# Patient Record
Sex: FEMALE | Race: BLACK OR AFRICAN AMERICAN | NOT HISPANIC OR LATINO | Employment: FULL TIME | URBAN - METROPOLITAN AREA
[De-identification: names, ages, dates, MRNs, and addresses within clinical notes are randomized per-mention and may not be internally consistent; named-entity substitution may affect disease eponyms.]

---

## 2017-01-24 ENCOUNTER — OFFICE VISIT (OUTPATIENT)
Dept: OBSTETRICS AND GYNECOLOGY | Age: 37
End: 2017-01-24

## 2017-01-24 VITALS
HEIGHT: 71 IN | SYSTOLIC BLOOD PRESSURE: 124 MMHG | WEIGHT: 251 LBS | BODY MASS INDEX: 35.14 KG/M2 | DIASTOLIC BLOOD PRESSURE: 86 MMHG

## 2017-01-24 DIAGNOSIS — E03.8 OTHER SPECIFIED HYPOTHYROIDISM: Primary | ICD-10-CM

## 2017-01-24 DIAGNOSIS — N76.0 ACUTE VAGINITIS: ICD-10-CM

## 2017-01-24 PROCEDURE — 99213 OFFICE O/P EST LOW 20 MIN: CPT | Performed by: PHYSICIAN ASSISTANT

## 2017-01-24 RX ORDER — METRONIDAZOLE 500 MG/1
500 TABLET ORAL 2 TIMES DAILY
Qty: 14 TABLET | Refills: 0 | Status: SHIPPED | OUTPATIENT
Start: 2017-01-24 | End: 2017-01-31

## 2017-01-24 RX ORDER — LEVOTHYROXINE SODIUM 0.07 MG/1
75 TABLET ORAL DAILY
Qty: 30 TABLET | Refills: 1 | Status: SHIPPED | OUTPATIENT
Start: 2017-01-24 | End: 2018-03-29

## 2017-01-24 NOTE — MR AVS SNAPSHOT
Jerrica Rosses   1/24/2017 11:00 AM   Office Visit    Dept Phone:  [phone]   Encounter #:  [id number]    Provider:  ROBEL Rdz   Department:  [facility]                Your Full Care Plan              Today's Medication Changes          These changes are accurate as of: 1/24/17 11:40 AM.  If you have any questions, ask your nurse or doctor.               New Medication(s)Ordered:     metroNIDAZOLE 500 MG tablet   Commonly known as:  FLAGYL   Take 1 tablet by mouth 2 (Two) Times a Day for 7 days.   Started by:  ROBEL Rdz            Where to Get Your Medications      These medications were sent to [facility] [address] [place] [address] [address] [facility] AT [facility] - [phone]  - [phone]   [address] [place] [id number]    Hours:  24-hours Phone:  [phone]     levothyroxine 75 MCG tablet    metroNIDAZOLE 500 MG tablet                  Your Updated Medication List          This list is accurate as of: 1/24/17 11:40 AM.  Always use your most recent med list.                amLODIPine 10 MG tablet   Commonly known as:  NORVASC       levothyroxine 75 MCG tablet   Commonly known as:  SYNTHROID, LEVOTHROID   Take 1 tablet by mouth Daily.       metroNIDAZOLE 500 MG tablet   Commonly known as:  FLAGYL   Take 1 tablet by mouth 2 (Two) Times a Day for 7 days.               We Performed the Following     NuSwab Vaginitis (VG)     TSH       You Were Diagnosed With        Codes Comments    Other specified hypothyroidism    -  Primary ICD-10-CM: E03.8  ICD-9-CM: 244.8     Acute vaginitis     ICD-10-CM: N76.0  ICD-9-CM: 616.10       Instructions     None    Patient Instructions History      Upcoming Appointments     Visit Type Date Time Department    GYN FOLLOW UP 1/24/2017 11:00 AM YANET GARCIA    WELLNESS 2/22/2017 12:00 PM YANET AGUERO [place]      MyChart Signup     [facility]  "allows you to send messages to your doctor, view your test results, renew your prescriptions, schedule appointments, and more. To sign up, go to [employer] and click on the Sign Up Now link in the New User? box. Enter your Offline Media Activation Code exactly as it appears below along with the last four digits of your Social Security Number and your Date of Birth () to complete the sign-up process. If you do not sign up before the expiration date, you must request a new code.    Offline Media Activation Code: [email or web address]  Expires: 2017 11:40 AM    If you have questions, you can email [email or web address] or call [phone] to talk to our [employer] staff. Remember, [employer] is NOT to be used for urgent needs. For medical emergencies, dial 911.               Other Info from Your Visit           Your Appointments     2017 12:00 PM EST   Wellness with Vicki Gooden MD   [facility] (--)    [address] [place] [id number] [phone]              Allergies     No Known Allergies      Reason for Visit     Follow-up c/o vaginal odor, and never followed up after iud insertion.      Vital Signs     Blood Pressure Height Weight Last Menstrual Period Breastfeeding? Body Mass Index    124/86 71\" (180.3 cm) 251 lb (114 kg) 2016 (Approximate) No 35.01 kg/m2    Smoking Status                   Never Smoker           Problems and Diagnoses Noted     Underactive thyroid    -  Primary    Inflammation of vagina            "

## 2017-01-24 NOTE — PROGRESS NOTES
"Subjective     Chief Complaint   Patient presents with   • Follow-up     c/o vaginal odor, and never followed up after iud insertion.       Jerrica Vargas is a 36 y.o.  whose LMP is Patient's last menstrual period was 2016 (approximate). presents with vaginal odor.  Seems to happen after her cycle. She is rarely having cycles since she placed the IUD in march of last year.  She denies VD risk.  She is pretty happy with her IUD otherwise.  It does look like she is overdue for her annual, she was not aware of that likely b/c she was seen frequently for irreg bleeding and iud placement.  She will schedule that    She is not currently taking her BP meds or thyroid meds as she has not been back to her PCP, she just doesn't really care for him and tried to find another doc but couldn't find one accepting new pt's.      No Additional Complaints Reported    The following portions of the patient's history were reviewed and updated as appropriate:vital signs, allergies, current medications, past medical history, past social history, past surgical history and problem list      Review of Systems   Pertinent items are noted in HPI.     Objective        Visit Vitals   • /86   • Ht 71\" (180.3 cm)   • Wt 251 lb (114 kg)   • LMP 2016 (Approximate)   • Breastfeeding No   • BMI 35.01 kg/m2       Physical Exam    General:   alert, comfortable and no distress   Heart: Not performed today   Lungs: Not performed today.   Breast: Not performed today   Neck: na   Abdomen: {Not performed today   CVA: Not performed today   Pelvis: Vaginal: discharge, white  Ph increased, + odor. IUD strings seen   Extremities: Not performed today   Neurologic: negative   Psychiatric: Normal affect, judgement, and mood       Lab Review   Labs: No data reviewed     Imaging   No data reviewed    Assessment/Plan     ASSESSMENT  1. Other specified hypothyroidism    2. Acute vaginitis        PLAN  1.   Orders Placed This Encounter "   Procedures   • NuSwab Vaginitis (VG)   • TSH       2. Medications prescribed this encounter:        New Medications Ordered This Visit   Medications   • levothyroxine (SYNTHROID, LEVOTHROID) 75 MCG tablet     Sig: Take 1 tablet by mouth Daily.     Dispense:  30 tablet     Refill:  1   • metroNIDAZOLE (FLAGYL) 500 MG tablet     Sig: Take 1 tablet by mouth 2 (Two) Times a Day for 7 days.     Dispense:  14 tablet     Refill:  0       3. Start Flagyl for suspected BV  4. Sent in refill of thyroid meds but advised to wait for  until we get her thyroid results back  5. Advised to find new pcp and in meantime see old PCP for refill of BP meds (BP is stable today)    Follow up: 4 week(s) approx for AE    ROBEL Rucker  1/24/2017

## 2017-01-25 LAB — TSH SERPL DL<=0.005 MIU/L-ACNC: 1.9 MIU/ML (ref 0.27–4.2)

## 2017-01-27 ENCOUNTER — TELEPHONE (OUTPATIENT)
Dept: OBSTETRICS AND GYNECOLOGY | Age: 37
End: 2017-01-27

## 2017-01-27 LAB
A VAGINAE DNA VAG QL NAA+PROBE: ABNORMAL SCORE
BVAB2 DNA VAG QL NAA+PROBE: ABNORMAL SCORE
C ALBICANS DNA VAG QL NAA+PROBE: NEGATIVE
C GLABRATA DNA VAG QL NAA+PROBE: NEGATIVE
MEGA1 DNA VAG QL NAA+PROBE: ABNORMAL SCORE
T VAGINALIS RRNA SPEC QL NAA+PROBE: NEGATIVE

## 2017-01-27 NOTE — TELEPHONE ENCOUNTER
----- Message from ROBEL Rdz sent at 1/27/2017  9:12 AM EST -----  Let her know she is showing + for BV, already treated at visit

## 2017-02-20 ENCOUNTER — OFFICE VISIT (OUTPATIENT)
Dept: OBSTETRICS AND GYNECOLOGY | Age: 37
End: 2017-02-20

## 2017-02-20 VITALS
BODY MASS INDEX: 34.44 KG/M2 | SYSTOLIC BLOOD PRESSURE: 128 MMHG | DIASTOLIC BLOOD PRESSURE: 80 MMHG | WEIGHT: 246 LBS | HEIGHT: 71 IN

## 2017-02-20 DIAGNOSIS — Z11.51 SCREENING FOR HPV (HUMAN PAPILLOMAVIRUS): ICD-10-CM

## 2017-02-20 DIAGNOSIS — Z01.419 ENCOUNTER FOR GYNECOLOGICAL EXAMINATION: Primary | ICD-10-CM

## 2017-02-20 PROCEDURE — 99395 PREV VISIT EST AGE 18-39: CPT | Performed by: OBSTETRICS & GYNECOLOGY

## 2017-02-20 NOTE — PROGRESS NOTES
"Subjective     Chief Complaint   Patient presents with   • Annual Exam     no problems       History of Present Illness    Jerrica Vargas is a 36 y.o.  who presents for annual exam.  Her menses are irregular, lasting 0-3 days, dysmenorrhea none; pt likes Mirena, has occasional menses with very light flow   Obstetric History:  OB History      Para Term  AB TAB SAB Ectopic Multiple Living    2 1 1  1  1   1         Menstrual History:     Patient's last menstrual period was 2017.         Current contraception: IUD  History of abnormal Pap smear: no  Received Gardasil immunization: no  Perform regular self breast exam: no  Family history of uterine or ovarian cancer: no  Family History of colon cancer: no  Family history of breast cancer: no    Mammogram: recommended baseline, pt will consider  Colonoscopy: not indicated.  DEXA: not indicated.    Exercise: moderately active      The following portions of the patient's history were reviewed and updated as appropriate: allergies, current medications, past family history, past medical history, past social history, past surgical history and problem list.    Review of Systems    A comprehensive review of systems was negative.     Objective   Physical Exam    Visit Vitals   • /80   • Ht 71\" (180.3 cm)   • Wt 246 lb (112 kg)   • LMP 2017   • BMI 34.31 kg/m2       General:   alert, appears stated age, cooperative and no distress   Neck: no asymmetry, masses, or scars and thyroid normal to palpation   Heart: regular rate and rhythm, S1, S2 normal, no murmur, click, rub or gallop   Lungs: clear to auscultation bilaterally   Abdomen: soft, non-tender, without masses or organomegaly   Breast: inspection negative, no nipple discharge or bleeding, no masses or nodularity palpable   Vulva: normal, Bartholin's, Urethra, [place]'s normal   Vagina: normal mucosa, normal discharge   Cervix: no lesions and and IUD string noted   Uterus: normal size, " mobile, non-tender   Adnexa: normal adnexa and no mass, fullness, tenderness   Rectal: not indicated     Assessment/Plan   Jerrica was seen today for annual exam.    Diagnoses and all orders for this visit:    Encounter for gynecological examination  -     IGP, Apt HPV,rfx 16 / 18,45    Screening for HPV (human papillomavirus)  -     IGP, Apt HPV,rfx 16 / 18,45        All questions answered.  Breast self exam technique reviewed and patient encouraged to perform self-exam monthly.  Discussed healthy lifestyle modifications.  Recommended 30 minutes of aerobic exercise five times per week.    Recommended baseline mammo, pt will consider    Happy with mirena, placed 4/16

## 2017-02-23 LAB
CYTOLOGIST CVX/VAG CYTO: NORMAL
CYTOLOGY CVX/VAG DOC THIN PREP: NORMAL
DX ICD CODE: NORMAL
HIV 1 & 2 AB SER-IMP: NORMAL
HPV I/H RISK 4 DNA CVX QL PROBE+SIG AMP: NEGATIVE
OTHER STN SPEC: NORMAL
PATH REPORT.FINAL DX SPEC: NORMAL
STAT OF ADQ CVX/VAG CYTO-IMP: NORMAL

## 2017-02-24 ENCOUNTER — TELEPHONE (OUTPATIENT)
Dept: OBSTETRICS AND GYNECOLOGY | Age: 37
End: 2017-02-24

## 2017-02-24 NOTE — TELEPHONE ENCOUNTER
----- Message from Vicki Gooden MD sent at 2/24/2017  2:01 AM EST -----  Reviewed negative pap and hpv  Routed to MA to call pt

## 2017-09-01 ENCOUNTER — TELEPHONE (OUTPATIENT)
Dept: OBSTETRICS AND GYNECOLOGY | Age: 37
End: 2017-09-01

## 2017-09-01 NOTE — TELEPHONE ENCOUNTER
Dr Gooden pt, pt has been having a light bron/dark red dx since her last period. Pt's last period was about 3 weeks ago. Pt states no smell. Pt states this normally happens after her periods but it typically only lasts for a couple of days. She spoke about this issue with Dr Gooden at her AE. Please advise.     Pt#: [phone]

## 2017-09-01 NOTE — TELEPHONE ENCOUNTER
She has the IUD, it's not uncommon to have irregular bleeding/spotting/prolonged period.  If she wants to be further evaluated we can schedule an ultrasound for further w/u. She can also perform a UPT at home to rule out that possibility

## 2017-09-07 ENCOUNTER — OFFICE VISIT (OUTPATIENT)
Dept: OBSTETRICS AND GYNECOLOGY | Age: 37
End: 2017-09-07

## 2017-09-07 ENCOUNTER — PROCEDURE VISIT (OUTPATIENT)
Dept: OBSTETRICS AND GYNECOLOGY | Age: 37
End: 2017-09-07

## 2017-09-07 VITALS
BODY MASS INDEX: 31.78 KG/M2 | WEIGHT: 227 LBS | HEIGHT: 71 IN | SYSTOLIC BLOOD PRESSURE: 130 MMHG | DIASTOLIC BLOOD PRESSURE: 80 MMHG

## 2017-09-07 DIAGNOSIS — N76.0 ACUTE VAGINITIS: ICD-10-CM

## 2017-09-07 DIAGNOSIS — T83.9XXA COMPLICATION OF INTRAUTERINE DEVICE (IUD), UNSPECIFIED COMPLICATION, INITIAL ENCOUNTER (HCC): Primary | ICD-10-CM

## 2017-09-07 DIAGNOSIS — Z30.431 IUD CHECK UP: Primary | ICD-10-CM

## 2017-09-07 LAB
B-HCG UR QL: NEGATIVE
INTERNAL NEGATIVE CONTROL: NEGATIVE
INTERNAL POSITIVE CONTROL: POSITIVE
Lab: NORMAL

## 2017-09-07 PROCEDURE — 76830 TRANSVAGINAL US NON-OB: CPT | Performed by: NURSE PRACTITIONER

## 2017-09-07 PROCEDURE — 99213 OFFICE O/P EST LOW 20 MIN: CPT | Performed by: NURSE PRACTITIONER

## 2017-09-07 PROCEDURE — 81025 URINE PREGNANCY TEST: CPT | Performed by: NURSE PRACTITIONER

## 2017-09-07 RX ORDER — SUMATRIPTAN 100 MG/1
100 TABLET, FILM COATED ORAL
COMMUNITY
Start: 2017-03-21 | End: 2018-03-21

## 2017-09-07 RX ORDER — METRONIDAZOLE 500 MG/1
500 TABLET ORAL 2 TIMES DAILY
Qty: 14 TABLET | Refills: 0 | Status: SHIPPED | OUTPATIENT
Start: 2017-09-07 | End: 2017-09-14

## 2017-09-07 NOTE — PROGRESS NOTES
"Subjective   Jerrica Vargas is a 37 y.o. female is being seen today for   Chief Complaint   Patient presents with   • Menorrhagia     Pt has been bleeding with her Mirena. Happens off and on. Denies any pain.    .    History of Present Illness     Patient here with complaints of irregular spotting and brown discharge with IUD.  She wasn't sure if it was a problem with the IUD or something else.  She has noticed it off and on for the past 6 months.  She is not having periods every month but when she does it seems to stop like normal but then she notices a brown discharge for days/weeks following it.  She also notices a fishy odor with the discharge.  She is sexually active, no new partners.  No itching.  She does douche occasionally and did think it got worse after.  She is having a cycle once every 3 months and they are not nearly as heavy as they used to be.    She is a patient of Dr Scott.  She is a new patient to me.    The following portions of the patient's history were reviewed and updated as appropriate: allergies, current medications, past family history, past medical history, past social history, past surgical history and problem list.    /80  Ht 71\" (180.3 cm)  Wt 227 lb (103 kg)  LMP Comment: Mirena  BMI 31.66 kg/m2      Review of Systems   Constitutional: Negative.    HENT: Negative.    Eyes: Negative.    Respiratory: Negative.    Cardiovascular: Negative.    Gastrointestinal: Negative.    Endocrine: Negative.    Genitourinary: Positive for menstrual problem and vaginal discharge. Negative for dyspareunia, dysuria, pelvic pain and vaginal pain.   Musculoskeletal: Negative.    Skin: Negative.    Allergic/Immunologic: Negative.    Neurological: Negative.    Hematological: Negative.    Psychiatric/Behavioral: Negative.        Objective   Physical Exam   Constitutional: She is oriented to person, place, and time. She appears well-developed and well-nourished.   Genitourinary: Uterus normal. " Uterus is not tender. Cervix exhibits no motion tenderness, no discharge and no friability.   Genitourinary Comments: Moderate watery discharge noted, vagina otherwise normal.  No lesions. +strings noted and appropriate length.   Neurological: She is alert and oriented to person, place, and time.   Psychiatric: She has a normal mood and affect. Her behavior is normal.         Assessment/Plan   Jerrica was seen today for menorrhagia.    Diagnoses and all orders for this visit:    Complication of intrauterine device (IUD), unspecified complication, initial encounter  -     POC Pregnancy, Urine  -     NuSwab VG+    Acute vaginitis  -     NuSwab VG+    Other orders  -     metroNIDAZOLE (FLAGYL) 500 MG tablet; Take 1 tablet by mouth 2 (Two) Times a Day for 7 days.      Ultrasound done to check IUD placement and it is normal and in place.  There are a few small fibroids noted but these have been seen prior and are unchanged.  Based on her exam and symptoms I suspect BV.  Patient would like to move forward with treatment.  Will call with culture results.

## 2017-09-07 NOTE — PROGRESS NOTES
I have reviewed the notes, assessments, and/or procedures performed by JOSSE Butler, I concur with her/his documentation of Jerrica Vargas.

## 2017-09-12 LAB
A VAGINAE DNA VAG QL NAA+PROBE: ABNORMAL SCORE
BVAB2 DNA VAG QL NAA+PROBE: ABNORMAL SCORE
C ALBICANS DNA VAG QL NAA+PROBE: NEGATIVE
C GLABRATA DNA VAG QL NAA+PROBE: NEGATIVE
C TRACH RRNA SPEC QL NAA+PROBE: NEGATIVE
MEGA1 DNA VAG QL NAA+PROBE: ABNORMAL SCORE
N GONORRHOEA RRNA SPEC QL NAA+PROBE: NEGATIVE
T VAGINALIS RRNA SPEC QL NAA+PROBE: NEGATIVE

## 2017-09-15 ENCOUNTER — TELEPHONE (OUTPATIENT)
Dept: OBSTETRICS AND GYNECOLOGY | Age: 37
End: 2017-09-15

## 2017-09-15 NOTE — TELEPHONE ENCOUNTER
----- Message from STAN Mathur sent at 9/12/2017  4:53 PM EDT -----  Notify + BV, she was already treated

## 2018-03-29 ENCOUNTER — OFFICE VISIT (OUTPATIENT)
Dept: OBSTETRICS AND GYNECOLOGY | Age: 38
End: 2018-03-29

## 2018-03-29 VITALS
SYSTOLIC BLOOD PRESSURE: 120 MMHG | DIASTOLIC BLOOD PRESSURE: 70 MMHG | HEIGHT: 71 IN | WEIGHT: 234 LBS | BODY MASS INDEX: 32.76 KG/M2

## 2018-03-29 DIAGNOSIS — Z11.51 ENCOUNTER FOR SCREENING FOR HUMAN PAPILLOMAVIRUS (HPV): ICD-10-CM

## 2018-03-29 DIAGNOSIS — Z11.3 SCREENING EXAMINATION FOR STD (SEXUALLY TRANSMITTED DISEASE): ICD-10-CM

## 2018-03-29 DIAGNOSIS — Z01.419 ENCOUNTER FOR GYNECOLOGICAL EXAMINATION: Primary | ICD-10-CM

## 2018-03-29 DIAGNOSIS — T83.32XA INTRAUTERINE CONTRACEPTIVE DEVICE THREADS LOST, INITIAL ENCOUNTER: ICD-10-CM

## 2018-03-29 PROCEDURE — 99395 PREV VISIT EST AGE 18-39: CPT | Performed by: OBSTETRICS & GYNECOLOGY

## 2018-03-29 RX ORDER — SUMATRIPTAN 100 MG/1
100 TABLET, FILM COATED ORAL
COMMUNITY
Start: 2017-11-28 | End: 2018-11-28

## 2018-03-29 NOTE — PROGRESS NOTES
"Subjective     Chief Complaint   Patient presents with   • Gynecologic Exam       History of Present Illness    Jerrica Vargas is a 37 y.o.  who presents for annual exam.  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         Current contraception: IUD  History of abnormal Pap smear: no  Received Gardasil immunization: yes - had course  Perform regular self breast exam: yes - monthly  Family history of uterine or ovarian cancer: no  Family History of colon cancer: no  Family history of breast cancer: no    Mammogram: not indicated.; pt declines baseline  Colonoscopy: not indicated.  DEXA: not indicated.    Exercise: moderately active  Calcium/Vitamin D: adequate intake    The following portions of the patient's history were reviewed and updated as appropriate: allergies, current medications, past family history, past medical history, past social history, past surgical history and problem list.    Review of Systems    Review of Systems   Constitutional: Negative for fatigue.   Respiratory: Negative for shortness of breath.    Gastrointestinal: Negative for abdominal pain.   Genitourinary: Negative for dysuria.negative for heavy or irregular bleeding   Neurological: Positive for headaches.   Psychiatric/Behavioral: Negative for dysphoric mood.         Objective   Physical Exam    /70   Ht 180.3 cm (71\")   Wt 106 kg (234 lb)   LMP 2018   BMI 32.64 kg/m²     General:   alert, appears stated age, cooperative and no distress   Neck: no asymmetry, masses, or scars and no adenopathy   Heart: regular rate and rhythm, S1, S2 normal, no murmur, click, rub or gallop   Lungs: clear to auscultation bilaterally "   Abdomen: soft, non-tender, without masses or organomegaly   Breast: inspection negative, no nipple discharge or bleeding, no masses or nodularity palpable and no axillary adenopathy   Vulva: normal, Bartholin's, Urethra, [place]'s normal   Vagina: normal mucosa, normal discharge   Cervix: no lesions and IUD string not seen   Uterus: normal size, mobile, non-tender, normal shape and consistency   Adnexa: normal adnexa and no mass, fullness, tenderness   Rectal: not indicated     Assessment/Plan   Jerrica was seen today for gynecologic exam.    Diagnoses and all orders for this visit:    Encounter for gynecological examination  -     IGP,CtNg,AptimaHPV,rfx16 / 18,45    Encounter for screening for human papillomavirus (HPV)  -     IGP,CtNg,AptimaHPV,rfx16 / 18,45    Screening examination for STD (sexually transmitted disease)  -     IGP,CtNg,AptimaHPV,rfx16 / 18,45    Intrauterine contraceptive device threads lost, initial encounter        All questions answered.  Breast self exam technique reviewed and patient encouraged to perform self-exam monthly.  Discussed healthy lifestyle modifications.  Recommended 30 minutes of aerobic exercise five times per week.    Pt did request cultures with pap but declines bloodwork for std's  Requested u/s to check IUD position since strings not seen; pt was unable to wait and scheduled u/s appt for f/u  IUD was checked with u/s 9/18 and in position

## 2018-04-02 LAB
C TRACH RRNA CVX QL NAA+PROBE: NEGATIVE
CYTOLOGIST CVX/VAG CYTO: NORMAL
CYTOLOGY CVX/VAG DOC THIN PREP: NORMAL
DX ICD CODE: NORMAL
HIV 1 & 2 AB SER-IMP: NORMAL
HPV I/H RISK 4 DNA CVX QL PROBE+SIG AMP: NEGATIVE
N GONORRHOEA RRNA CVX QL NAA+PROBE: NEGATIVE
OTHER STN SPEC: NORMAL
PATH REPORT.FINAL DX SPEC: NORMAL
STAT OF ADQ CVX/VAG CYTO-IMP: NORMAL

## 2018-04-09 ENCOUNTER — TELEPHONE (OUTPATIENT)
Dept: OBSTETRICS AND GYNECOLOGY | Age: 38
End: 2018-04-09

## 2018-04-09 NOTE — TELEPHONE ENCOUNTER
----- Message from Vicki Gooden MD sent at 4/8/2018 10:33 AM EDT -----  Call Ms. Vargas, her pap, HPV and CZ/GC are all negative

## 2018-04-11 ENCOUNTER — OFFICE VISIT (OUTPATIENT)
Dept: OBSTETRICS AND GYNECOLOGY | Age: 38
End: 2018-04-11

## 2018-04-11 ENCOUNTER — PROCEDURE VISIT (OUTPATIENT)
Dept: OBSTETRICS AND GYNECOLOGY | Age: 38
End: 2018-04-11

## 2018-04-11 VITALS
SYSTOLIC BLOOD PRESSURE: 122 MMHG | BODY MASS INDEX: 33.32 KG/M2 | DIASTOLIC BLOOD PRESSURE: 74 MMHG | HEIGHT: 71 IN | WEIGHT: 238 LBS

## 2018-04-11 DIAGNOSIS — D21.9 LEIOMYOMA: ICD-10-CM

## 2018-04-11 DIAGNOSIS — Z30.431 IUD CHECK UP: Primary | ICD-10-CM

## 2018-04-11 PROCEDURE — 76830 TRANSVAGINAL US NON-OB: CPT | Performed by: OBSTETRICS & GYNECOLOGY

## 2018-04-11 PROCEDURE — 99213 OFFICE O/P EST LOW 20 MIN: CPT | Performed by: PHYSICIAN ASSISTANT

## 2018-04-11 NOTE — PROGRESS NOTES
"Subjective     Chief Complaint   Patient presents with   • Follow-up     check iud strings, when pt was seen for annual dr jung did not see the iud strings.       Jerrica Vargas is a 38 y.o.  whose LMP is Patient's last menstrual period was 2018 (approximate). presents for IUD check. She was told to c/i for an u/s to further eval placement of her IUD. She has noted decreased bleeding with the IUD and loves it. She has never checked strings before.  She was just seen for her annual exam with Dr Jung      No Additional Complaints Reported    The following portions of the patient's history were reviewed and updated as appropriate:vital signs, allergies, current medications, past family history, past medical history, past social history, past surgical history and problem list      Review of Systems   A comprehensive review of systems was negative except for: Genitourinary: positive for iud check     Objective      /74   Ht 180.3 cm (71\")   Wt 108 kg (238 lb)   LMP 2018 (Approximate)   BMI 33.19 kg/m²     Physical Exam    General:   alert, comfortable and no distress   Heart: Not performed today   Lungs: Not performed today.   Breast: Not performed today   Neck: na   Abdomen: {Not performed today   CVA: Not performed today   Pelvis: Pt declined further exam   Extremities: Not performed today   Neurologic: negative   Psychiatric: Normal affect, judgement, and mood       Lab Review   Labs: No data reviewed     Imaging   Ultrasound - Pelvic Vaginal  IUD in place, 2 fibroids noted, measure 19.1 x 15.3 and 27.7 x 29.9 mm    Assessment/Plan     ASSESSMENT  1. IUD check up        PLAN  1. IUD seen in u/s and pt is reassured.  She has been advised to monitor her strings if she has pain or a change in her bleeding pattern    Follow up: 1 year(s)    ROBEL Rucker  2018           "

## 2021-01-25 ENCOUNTER — TELEPHONE (OUTPATIENT)
Dept: OBSTETRICS AND GYNECOLOGY | Age: 41
End: 2021-01-25

## 2021-01-25 NOTE — TELEPHONE ENCOUNTER
Patient called, stated that she went to see her PCP last week and also had an Ultrasound done at [facility], and it returned that the patient had an abnormal Ultrasound.     Patient stated that they advised her to follow up with her gynecologist to be evaluated, they also mentioned that it was a urgent matter. Please advise     Call Back Number [phone]

## 2021-01-26 ENCOUNTER — OFFICE VISIT (OUTPATIENT)
Dept: OBSTETRICS AND GYNECOLOGY | Age: 41
End: 2021-01-26

## 2021-01-26 VITALS
DIASTOLIC BLOOD PRESSURE: 70 MMHG | HEIGHT: 68 IN | SYSTOLIC BLOOD PRESSURE: 120 MMHG | WEIGHT: 213.2 LBS | BODY MASS INDEX: 32.31 KG/M2

## 2021-01-26 DIAGNOSIS — D64.9 ANEMIA, UNSPECIFIED TYPE: ICD-10-CM

## 2021-01-26 DIAGNOSIS — N85.2 UTERINE ENLARGEMENT: ICD-10-CM

## 2021-01-26 DIAGNOSIS — N92.0 MENORRHAGIA WITH REGULAR CYCLE: Primary | ICD-10-CM

## 2021-01-26 PROBLEM — G43.709 CHRONIC MIGRAINE WITHOUT AURA WITHOUT STATUS MIGRAINOSUS, NOT INTRACTABLE: Status: ACTIVE | Noted: 2020-01-08

## 2021-01-26 PROBLEM — D57.3 SICKLE CELL TRAIT (HCC): Status: ACTIVE | Noted: 2021-01-13

## 2021-01-26 PROBLEM — Z98.84 HX OF LAPAROSCOPIC GASTRIC BANDING: Status: ACTIVE | Noted: 2021-01-13

## 2021-01-26 PROBLEM — R73.9 HYPERGLYCEMIA: Status: ACTIVE | Noted: 2020-01-08

## 2021-01-26 PROCEDURE — 81025 URINE PREGNANCY TEST: CPT | Performed by: OBSTETRICS & GYNECOLOGY

## 2021-01-26 PROCEDURE — 99213 OFFICE O/P EST LOW 20 MIN: CPT | Performed by: OBSTETRICS & GYNECOLOGY

## 2021-01-26 NOTE — PROGRESS NOTES
"Chief complaint:heavy bleeding    HPI  Jerrica Vargas is a 40 y.o. female comes in today after seeing her primary care MD for gyn care for several years. She reports she had scant bleeding with IUD for years. She then noted some heavier flow that started this past summer. She saw her primary MD and they advised her IUD was coming out and they removed it. She reports that since they removed her IUD, she has had very heavy bleeding. She returned to her primary MD and they diagnosed her with anemia and ordered a vaginal u/s. She reports she has not been sexually active so is using abstinence for contraception. She notes she has also noted an increase in the size of her abdomen over the last several months. She lost weight with lap band surgery but noted her stomach continued to get bigger. She reports her LMP was last week and she stopped bleeding 2 days ago. She denies bleeding between cycles reporting flow is regular but very heavy requiring frequent tampon and pad changes. She reports she felt lightheaded during her recent bleeding episode like she was going to pass out. She is still tired but not as lightheaded as during her flow.         The following portions of the patient's history were reviewed and updated as appropriate: allergies, current medications, past family history, past medical history, past social history, past surgical history and problem list.    Review of Systems  Constitutional: negative for chills and fevers  Respiratory: negative for shortness of air  Cardiovascular: positive for near-syncope  Gastrointestinal: negative for abdominal pain, nausea and vomiting  Genitourinary:positive for heavy menstrual flow  Integument/breast: negative for rash  Hematologic/lymphatic: positive for heavy bleeding during menses; negative for easy bruising  Neurological: positive for dizziness and headaches    /70   Ht 172.7 cm (68\")   Wt 96.7 kg (213 lb 3.2 oz)   LMP 01/21/2021 (Exact Date)   " Breastfeeding No   BMI 32.42 kg/m²         Physical Exam  Constitutional:       Appearance: Normal appearance.   HENT:      Head: Normocephalic and atraumatic.   Neck:      Musculoskeletal: Neck supple.   Cardiovascular:      Rate and Rhythm: Regular rhythm. Tachycardia present.   Pulmonary:      Effort: Pulmonary effort is normal.   Abdominal:      General: Abdomen is flat. There is no distension.      Palpations: Abdomen is soft.      Tenderness: There is no abdominal tenderness.   Genitourinary:     Comments: Normal external female genitalia, normal vagina and cervix. Uterus enlarged, 16 weeks and irregular. No significant uterine or adnexal tenderness. Evaluation of adnexa limited by uterine enlargement.   Skin:     General: Skin is warm and dry.   Neurological:      General: No focal deficit present.      Mental Status: She is alert and oriented to person, place, and time.   Psychiatric:         Mood and Affect: Mood normal.         Behavior: Behavior normal.       Pelvic u/s:1/22/2021 at [place]:uterus with multiple heterogenous leiomyomas, largest 6 X 6 X 6 cm, arising from enlarged uterus. Ovaries not visualized.   hgb 8.7 on 1/25/2021      Diagnoses and all orders for this visit:    1. Menorrhagia with regular cycle (Primary)  -     Ambulatory Referral to Gynecologic Oncology    2. Uterine enlargement  -     Ambulatory Referral to Gynecologic Oncology  -     POC Pregnancy, Urine    3. Anemia, unspecified type      Menorrhagia with severe symptomatic anemia and uterus with rapid enlargement. Pt does not desire future fertility. Plan urgent consult with gyn oncology. Pt counseled and agrees with plan.    Addendum: first appt available with gyn onc MD is next week. Pt denies any current distress and appears comfortable currently. She has mild tachycardia but is ambulating without issue today. Advised her to proceed to ER with any chest pain, shortness of air, significant lightheadedness or recurrent bleeding  prior to her appt next week.     Called pt following visit to recommend she start an iron supplement. She notes she is taking iron but did not list it. Recommend she increase to atleast twice daily. She notes understanding but reports she is feeling progressive fatigue today. Recommend she proceed to ER as she may need admission and pt agrees.

## 2021-04-02 ENCOUNTER — BULK ORDERING (OUTPATIENT)
Dept: CASE MANAGEMENT | Facility: OTHER | Age: 41
End: 2021-04-02

## 2021-04-02 DIAGNOSIS — Z23 IMMUNIZATION DUE: ICD-10-CM

## 2021-09-17 ENCOUNTER — TRANSCRIBE ORDERS (OUTPATIENT)
Dept: SLEEP MEDICINE | Facility: HOSPITAL | Age: 41
End: 2021-09-17

## 2021-09-17 DIAGNOSIS — Z01.818 OTHER SPECIFIED PRE-OPERATIVE EXAMINATION: Primary | ICD-10-CM

## 2021-10-01 ENCOUNTER — LAB (OUTPATIENT)
Dept: LAB | Facility: HOSPITAL | Age: 41
End: 2021-10-01

## 2021-10-01 DIAGNOSIS — Z01.818 OTHER SPECIFIED PRE-OPERATIVE EXAMINATION: ICD-10-CM

## 2021-10-01 LAB — SARS-COV-2 ORF1AB RESP QL NAA+PROBE: NOT DETECTED

## 2021-10-01 PROCEDURE — U0004 COV-19 TEST NON-CDC HGH THRU: HCPCS

## 2021-10-01 PROCEDURE — C9803 HOPD COVID-19 SPEC COLLECT: HCPCS

## 2021-10-01 RX ORDER — DOXYCYCLINE HYCLATE 50 MG/1
324 CAPSULE, GELATIN COATED ORAL
COMMUNITY
Start: 2021-08-04 | End: 2022-01-31

## 2021-10-01 RX ORDER — DIPHENOXYLATE HYDROCHLORIDE AND ATROPINE SULFATE 2.5; .025 MG/1; MG/1
1 TABLET ORAL DAILY
COMMUNITY
End: 2022-07-11

## 2021-10-03 RX ORDER — CEFAZOLIN SODIUM 2 G/100ML
2 INJECTION, SOLUTION INTRAVENOUS ONCE
Status: DISCONTINUED | OUTPATIENT
Start: 2021-10-04 | End: 2021-10-04 | Stop reason: HOSPADM

## 2021-10-04 ENCOUNTER — HOSPITAL ENCOUNTER (OUTPATIENT)
Facility: HOSPITAL | Age: 41
Setting detail: HOSPITAL OUTPATIENT SURGERY
Discharge: HOME | End: 2021-10-04
Attending: SPECIALIST | Admitting: SPECIALIST

## 2021-10-04 VITALS
OXYGEN SATURATION: 100 % | WEIGHT: 213 LBS | SYSTOLIC BLOOD PRESSURE: 155 MMHG | HEART RATE: 76 BPM | DIASTOLIC BLOOD PRESSURE: 102 MMHG | TEMPERATURE: 98.3 F | BODY MASS INDEX: 32.28 KG/M2 | HEIGHT: 68 IN | RESPIRATION RATE: 16 BRPM

## 2021-10-04 DIAGNOSIS — L90.5 SCAR OF BREAST: Primary | ICD-10-CM

## 2021-10-04 RX ORDER — MAGNESIUM HYDROXIDE 1200 MG/15ML
LIQUID ORAL AS NEEDED
Status: DISCONTINUED | OUTPATIENT
Start: 2021-10-04 | End: 2021-10-04 | Stop reason: HOSPADM

## 2021-10-04 RX ORDER — WOUND DRESSING ADHESIVE - LIQUID
LIQUID MISCELLANEOUS AS NEEDED
Status: DISCONTINUED | OUTPATIENT
Start: 2021-10-04 | End: 2021-10-04 | Stop reason: HOSPADM

## 2021-10-04 RX ORDER — OXYCODONE HYDROCHLORIDE AND ACETAMINOPHEN 5; 325 MG/1; MG/1
1-2 TABLET ORAL EVERY 4 HOURS PRN
Qty: 10 TABLET | Refills: 0 | Status: SHIPPED | OUTPATIENT
Start: 2021-10-04 | End: 2022-08-15 | Stop reason: ALTCHOICE

## 2021-10-04 RX ORDER — BUPIVACAINE HYDROCHLORIDE AND EPINEPHRINE 5; 5 MG/ML; UG/ML
INJECTION, SOLUTION EPIDURAL; INTRACAUDAL; PERINEURAL AS NEEDED
Status: DISCONTINUED | OUTPATIENT
Start: 2021-10-04 | End: 2021-10-04 | Stop reason: HOSPADM

## 2021-10-04 NOTE — OP NOTE
Patient Care Team:  Gabriela Tabor APRN as PCP - General (Family Medicine)    Jerrica Vargas  [id number]    Operation: Excision 2.2 cm scar deformity left medial breast with a 6.4 cm complex repair.    Pre-Op Diagnosis: Scar deformity left medial breast.    Post-Op Diagnosis: Same.    Surgeon:  Sundeep Kaplan Jr., MD    Assistant: None.    Anesthesia: Local.    Indications:  41-year-old -American female presents at 5 foot 8 inches in height and 213 pounds in weight who reported a small lesion/cyst of the left medial breast which she then manipulated resulting in wound breakdown and healing by secondary intention then scar revision by Dr. Choi 3 years ago with recurrence.  The patient is concerned about tenderness of the possibility of a keloid.  The patient is a non-smoker.  She presents now for scar revision of the left medial breast.  The procedure, expectations, the possibility of infection, bleeding, scar deformity, asymmetry, recurrence, and other risks, benefits, alternatives, and possible complications of the procedure, were explained to the patient's apparent satisfaction and she wishes us to proceed.    Narrative: On October 4, 2021 the patient was seen preoperatively and the area in question marked out including an ellipse on the resting skin tension lines of the area of the medial left breast and this was shown to the patient preoperatively.  The patient was then taken to the operating room, placed supine, and informed consent was given.  The patient was then prepped and draped in the usual sterile fashion.  The area in question was infiltrated with a cumulative total of 50 cc of 0.5% Marcaine with epinephrine 1-200,000.  The ellipse was incised and removed and the tissue discarded as it was just a scar and did not require pathologic evaluation.  Hemostasis was effected with electrocautery and the wound was undermined with spreading scissor technique in every direction to allow closure with  decreased tension and to avoid standing cones.  The wound was irrigated with saline and after final inspect for hemostasis which was felt to be adequate, wound closure was effected with simple inverted interrupted sutures of 2-0 and 3-0 Vicryl for dermal approximation followed by a running 4-0 Monocryl in an intracuticular fashion for skin approximation.  The wound was cleansed and appropriate dressings applied.  The patient was taken back to her preoperative holding area in satisfactory condition.  There were no intraoperative complications.  Sponge and needle counts were correct.    Drains: None.    Specimen: None.    Estimated Blood Loss: Minimal.    Complications: None.    Sundeep Kaplan Jr., MD  10/04/21  14:08 EDT

## 2021-10-04 NOTE — H&P
[facility]   HISTORY AND PHYSICAL    Patient Name: Jerrica Vargas  : 1980  MRN: [id number]  Primary Care Physician:  Gabriela Tabor APRN  Date of admission: 2021    Subjective   Subjective     Chief Complaint: Scar deformity left breast-medial aspect.    History of Present Illness: 41-year-old -American female presents at 5 foot 8 inches in height and 213 pounds in weight who reported a small lesion/cyst of the left medial breast which she then manipulated resulting in wound breakdown and healing by secondary intention then scar revision by Dr. Choi 3 years ago with recurrence.  The patient is concerned about tenderness of the possibility of a keloid.  The patient is a non-smoker.  She presents now for scar revision of the left medial breast.  The procedure, expectations, the possibility of infection, bleeding, scar deformity, asymmetry, recurrence, and other risks, benefits, alternatives, and possible complications of the procedure, were explained to the patient's apparent satisfaction and she wishes us to proceed.    Review of Systems: Noncontributory.    Personal History     Past Medical History:   Diagnosis Date   • Anemia    • Disease of thyroid gland    • Hypertension    • Migraine    • Scar of breast     LEFT BREAST    • Sickle cell trait (HCC)        Past Surgical History:   Procedure Laterality Date   •  SECTION     • GASTRIC BYPASS  2020    lapband   • KNEE MENISCECTOMY Right 2020       Family History: family history includes Asthma in her sister; Breast cancer in her mother; Hypertension in her father and mother; No Known Problems in her maternal grandfather, maternal grandmother, paternal grandfather, and paternal grandmother; Prostate cancer in her father; Throat cancer in her father. Otherwise pertinent FHx was reviewed and not pertinent to current issue.    Social History:  reports that she has never smoked. She does not have any smokeless tobacco  history on file. She reports current alcohol use. She reports that she does not use drugs.    Home Medications:  amLODIPine, ferrous gluconate, levonorgestrel, and multivitamin    Allergies:  No Known Allergies    Objective    Objective     Vitals:        Physical Exam: Pleasant 41-year-old -American female in no acute distress.  There is a 1.5 cm fairly well-circumscribed, hypertrophic, raised scar in the left medial breast near the midline.  There is tenderness to palpation but no drainage nor infection are identified.  The lungs are clear to auscultation.  Cardiac exam is normal.    Result Review    Result Review:  I have personally reviewed the results from the time of this admission to 10/3/2021 23:03 EDT and agree with these findings:  [x]  Laboratory  []  Microbiology  []  Radiology  []  EKG/Telemetry   []  Cardiology/Vascular   []  Pathology  []  Old records  []  Other:  Most notable findings include: Absence of recent chemistry and hematologic lab studies.  The patient is no to have a history of chronic anemia.  Assessment/Plan   Assessment / Plan     Brief Patient Summary:  Jerrica Vargas is a 41 y.o. female who has a scar deformity of the left medial breast and is here for scar revision.      Active Hospital Problems:  There are no active hospital problems to display for this patient.    Plan: Scar revision left medial breast.      DVT prophylaxis: Perioperative  Sequential compression devices and early postoperative ambulation.  CODE STATUS:       Admission Status:  I believe this patient meets outpatient status.    Electronically signed by Sundeep Kaplan Jr., MD, 10/03/21, 11:03 PM EDT.

## 2021-10-04 NOTE — NURSING NOTE
Pt talked with dr bucio about her nerves. He discussed with anesthia options, talked with pt again and they opted to go with the local route

## 2021-11-22 ENCOUNTER — OFFICE (OUTPATIENT)
Dept: URBAN - METROPOLITAN AREA CLINIC 75 | Facility: CLINIC | Age: 41
End: 2021-11-22
Payer: COMMERCIAL

## 2021-11-22 VITALS
DIASTOLIC BLOOD PRESSURE: 90 MMHG | OXYGEN SATURATION: 95 % | SYSTOLIC BLOOD PRESSURE: 124 MMHG | HEIGHT: 68 IN | HEART RATE: 70 BPM | WEIGHT: 210 LBS

## 2021-11-22 DIAGNOSIS — R10.13 EPIGASTRIC PAIN: ICD-10-CM

## 2021-11-22 DIAGNOSIS — R11.2 NAUSEA WITH VOMITING, UNSPECIFIED: ICD-10-CM

## 2021-11-22 DIAGNOSIS — K21.9 GASTRO-ESOPHAGEAL REFLUX DISEASE WITHOUT ESOPHAGITIS: ICD-10-CM

## 2021-11-22 DIAGNOSIS — K62.5 HEMORRHAGE OF ANUS AND RECTUM: ICD-10-CM

## 2021-11-22 DIAGNOSIS — K59.00 CONSTIPATION, UNSPECIFIED: ICD-10-CM

## 2021-11-22 PROCEDURE — 99204 OFFICE O/P NEW MOD 45 MIN: CPT | Performed by: INTERNAL MEDICINE

## 2021-11-22 NOTE — SERVICEHPINOTES
Thank you very much for referring Ms. Agustin for evaluation.  He she know she is a pleasant 41-year-old  female who is status post lap band.  She is lost about 50 pounds done well with the band.  She's here now however because for the past month or so she started having problems with regurgitation, she's having nausea, nausea and vomiting.  She says initially she thought the symptoms were with tomato-based products but now even with chicken she'll have symptoms.  She says anytime she eats eggs she has nausea and vomiting.  She was having nocturnal regurgitation but now has symptoms during the day.  She was started on a PPI with some improvement.  There is no history of peptic ulcer disease.  There is no nonsteroidal use.  She has not had her lap band a chest did for some time but didn't see her bariatric surgeon it was just adjusted to see if it helped her symptoms.  She also has epigastric pain and she says sometimes "it feels stuck".  She does not smoke or drink.  She is in no acute distress.
br
br She's also recently had a change in bowel habits with constipation and straining.  She has noted blood with bowel movements which is new. She also has a history of anemia. There is no family history of polyps, colitis or colon cancer.  Her mother had gallbladder disease.  He is in no acute distress, she does not look acutely ill.

## 2022-01-14 VITALS
WEIGHT: 210 LBS | SYSTOLIC BLOOD PRESSURE: 140 MMHG | DIASTOLIC BLOOD PRESSURE: 68 MMHG | HEART RATE: 88 BPM | DIASTOLIC BLOOD PRESSURE: 91 MMHG | RESPIRATION RATE: 13 BRPM | SYSTOLIC BLOOD PRESSURE: 147 MMHG | SYSTOLIC BLOOD PRESSURE: 130 MMHG | DIASTOLIC BLOOD PRESSURE: 89 MMHG | HEART RATE: 66 BPM | SYSTOLIC BLOOD PRESSURE: 117 MMHG | DIASTOLIC BLOOD PRESSURE: 76 MMHG | HEART RATE: 84 BPM | SYSTOLIC BLOOD PRESSURE: 163 MMHG | OXYGEN SATURATION: 100 % | RESPIRATION RATE: 11 BRPM | RESPIRATION RATE: 22 BRPM | SYSTOLIC BLOOD PRESSURE: 121 MMHG | RESPIRATION RATE: 12 BRPM | HEART RATE: 79 BPM | HEART RATE: 68 BPM | SYSTOLIC BLOOD PRESSURE: 143 MMHG | TEMPERATURE: 97.2 F | DIASTOLIC BLOOD PRESSURE: 59 MMHG | HEART RATE: 67 BPM | TEMPERATURE: 97 F | RESPIRATION RATE: 18 BRPM | DIASTOLIC BLOOD PRESSURE: 84 MMHG | SYSTOLIC BLOOD PRESSURE: 137 MMHG | DIASTOLIC BLOOD PRESSURE: 95 MMHG | RESPIRATION RATE: 19 BRPM | DIASTOLIC BLOOD PRESSURE: 82 MMHG | RESPIRATION RATE: 7 BRPM | SYSTOLIC BLOOD PRESSURE: 142 MMHG | OXYGEN SATURATION: 98 % | SYSTOLIC BLOOD PRESSURE: 128 MMHG | SYSTOLIC BLOOD PRESSURE: 129 MMHG | RESPIRATION RATE: 9 BRPM | DIASTOLIC BLOOD PRESSURE: 88 MMHG | RESPIRATION RATE: 23 BRPM | HEART RATE: 69 BPM | HEART RATE: 77 BPM | DIASTOLIC BLOOD PRESSURE: 75 MMHG | HEART RATE: 100 BPM | HEART RATE: 73 BPM | HEART RATE: 70 BPM | RESPIRATION RATE: 20 BRPM | SYSTOLIC BLOOD PRESSURE: 146 MMHG | DIASTOLIC BLOOD PRESSURE: 86 MMHG | HEIGHT: 68 IN | RESPIRATION RATE: 16 BRPM

## 2022-01-18 ENCOUNTER — OFFICE (OUTPATIENT)
Dept: URBAN - METROPOLITAN AREA PATHOLOGY 4 | Facility: PATHOLOGY | Age: 42
End: 2022-01-18

## 2022-01-18 ENCOUNTER — AMBULATORY SURGICAL CENTER (OUTPATIENT)
Dept: URBAN - METROPOLITAN AREA SURGERY 17 | Facility: SURGERY | Age: 42
End: 2022-01-18

## 2022-01-18 DIAGNOSIS — K64.8 OTHER HEMORRHOIDS: ICD-10-CM

## 2022-01-18 DIAGNOSIS — R10.13 EPIGASTRIC PAIN: ICD-10-CM

## 2022-01-18 DIAGNOSIS — K31.A11 GASTRIC INTESTINAL METAPLASIA WITHOUT DYSPLASIA, INVOLVING T: ICD-10-CM

## 2022-01-18 DIAGNOSIS — K62.5 HEMORRHAGE OF ANUS AND RECTUM: ICD-10-CM

## 2022-01-18 DIAGNOSIS — K31.89 OTHER DISEASES OF STOMACH AND DUODENUM: ICD-10-CM

## 2022-01-18 DIAGNOSIS — Z98.84 BARIATRIC SURGERY STATUS: ICD-10-CM

## 2022-01-18 DIAGNOSIS — K21.9 GASTRO-ESOPHAGEAL REFLUX DISEASE WITHOUT ESOPHAGITIS: ICD-10-CM

## 2022-01-18 DIAGNOSIS — R11.2 NAUSEA WITH VOMITING, UNSPECIFIED: ICD-10-CM

## 2022-01-18 PROBLEM — Z48.815 ENCNTR FOR SURGICAL AFTCR FOLLOWING SURGERY ON THE DGSTV SYS: Status: ACTIVE | Noted: 2022-01-18

## 2022-01-18 LAB
GI HISTOLOGY: A. UNSPECIFIED: (no result)
GI HISTOLOGY: B. UNSPECIFIED: (no result)
GI HISTOLOGY: PDF REPORT: (no result)

## 2022-01-18 PROCEDURE — 43239 EGD BIOPSY SINGLE/MULTIPLE: CPT | Performed by: INTERNAL MEDICINE

## 2022-01-18 PROCEDURE — 88342 IMHCHEM/IMCYTCHM 1ST ANTB: CPT | Performed by: INTERNAL MEDICINE

## 2022-01-18 PROCEDURE — 45378 DIAGNOSTIC COLONOSCOPY: CPT | Performed by: INTERNAL MEDICINE

## 2022-01-18 PROCEDURE — 88305 TISSUE EXAM BY PATHOLOGIST: CPT | Performed by: INTERNAL MEDICINE

## 2022-01-18 NOTE — SERVICEHPINOTES
Thank you very much for referring Ms. Agustin for evaluation.  He she know she is a pleasant 41-year-old  female who is status post lap band.  She is lost about 50 pounds done well with the band.  She's here now however because for the past month or so she started having problems with regurgitation, she's having nausea, nausea and vomiting.  She says initially she thought the symptoms were with tomato-based products but now even with chicken she'll have symptoms.  She says anytime she eats eggs she has nausea and vomiting.  She was having nocturnal regurgitation but now has symptoms during the day.  She was started on a PPI with some improvement.  There is no history of peptic ulcer disease.  There is no nonsteroidal use.  She has not had her lap band a chest did for some time but didn't see her bariatric surgeon it was just adjusted to see if it helped her symptoms.  She also has epigastric pain and she says sometimes "it feels stuck".  She does not smoke or drink.  She is in no acute distress.She's also recently had a change in bowel habits with constipation and straining.  She has noted blood with bowel movements which is new. She also has a history of anemia. There is no family history of polyps, colitis or colon cancer.  Her mother had gallbladder disease.  He is in no acute distress, she does not look acutely ill.

## 2022-05-04 ENCOUNTER — APPOINTMENT (OUTPATIENT)
Dept: RADIATION ONCOLOGY | Facility: HOSPITAL | Age: 42
End: 2022-05-04

## 2022-05-04 ENCOUNTER — CONSULT (OUTPATIENT)
Dept: RADIATION ONCOLOGY | Facility: HOSPITAL | Age: 42
End: 2022-05-04

## 2022-05-04 VITALS
HEART RATE: 75 BPM | BODY MASS INDEX: 31.85 KG/M2 | RESPIRATION RATE: 16 BRPM | SYSTOLIC BLOOD PRESSURE: 132 MMHG | OXYGEN SATURATION: 95 % | TEMPERATURE: 98.5 F | WEIGHT: 209.4 LBS | DIASTOLIC BLOOD PRESSURE: 92 MMHG

## 2022-05-04 DIAGNOSIS — L91.0 KELOID: Primary | ICD-10-CM

## 2022-05-04 PROCEDURE — 99203 OFFICE O/P NEW LOW 30 MIN: CPT | Performed by: RADIOLOGY

## 2022-05-04 NOTE — PROGRESS NOTES
"      Subjective     Sundeep Kaplan Jr., MD    Cancer Staging  No matching staging information was found for the patient.    CC: keloid left medial breast                                Dear Sundeep Kaplan Jr., MD    I had the pleasure of seeing Jerrica Vargas  today in the Radiation Center    The patient is a 42 y.o. female with a history of a keloid on her left medial breast for which she underwent excision with Dr. Kaplan in 2021.  She did not receive radiation at that time.  She presented to Dr. Kaplan in 2022 with recurrent keloid in the same area of left medial breast 0.9 x 4.3cm according to his note.  He has discussed re-excision of the keloid followed by radiation.  She is referred today for evaluation for radiation.    She reports occasional \"tightness\" associated with the scar/keoid but no real pain.          Review of Systems   Constitutional: Negative.    Respiratory: Negative.    Musculoskeletal: Negative.    Skin: Positive for color change and wound.   Neurological: Negative.    Hematological: Negative.    Psychiatric/Behavioral: Negative.          Past Medical History:   Diagnosis Date   • Anemia    • Disease of thyroid gland    • Hypertension    • Migraine    • Scar of breast     LEFT BREAST    • Sickle cell trait (HCC)          Past Surgical History:   Procedure Laterality Date   •  SECTION     • GASTRIC BYPASS  2020    lapband   • HYSTERECTOMY     • KNEE MENISCECTOMY Right 2020   • SCAR REVISION BREAST Left 10/4/2021    Procedure: EXCISION OF SCAR LEFT MEDIAL BREAST WITH COMPLEX REAPIR;  Surgeon: Sundeep Kaplan Jr., MD;  Location: [facility] OR [facility] [place];  Service: Plastics;  Laterality: Left;         Social History     Socioeconomic History   • Marital status: Single   Tobacco Use   • Smoking status: Never Smoker   Substance and Sexual Activity   • Alcohol use: Yes     Comment: occ   • Drug use: No   • Sexual activity: Yes     Partners: Male     Birth control/protection: Abstinence     "     Family History   Problem Relation Age of Onset   • Hypertension Mother    • Breast cancer Mother    • Asthma Sister    • Throat cancer Father    • Prostate cancer Father    • Hypertension Father    • No Known Problems Paternal Grandfather    • No Known Problems Paternal Grandmother    • No Known Problems Maternal Grandmother    • No Known Problems Maternal Grandfather    • Ovarian cancer Neg Hx    • Uterine cancer Neg Hx    • Colon cancer Neg Hx    • Malig Hyperthermia Neg Hx           Objective    Physical Exam  Constitutional:       Appearance: Normal appearance.   Chest:          Comments: 1cm x 4cm keloid upper medial left breast  Neurological:      General: No focal deficit present.      Mental Status: She is alert.   Psychiatric:         Mood and Affect: Mood normal.           Current Outpatient Medications on File Prior to Visit   Medication Sig Dispense Refill   • amLODIPine (NORVASC) 10 MG tablet Take 10 mg by mouth Daily.     • levonorgestrel (MIRENA, 52 MG,) 20 MCG/24HR IUD 1 each by Intrauterine route 1 (One) Time.     • multivitamin (THERAGRAN) tablet tablet Take 1 tablet by mouth Daily.     • oxyCODONE-acetaminophen (PERCOCET) 5-325 MG per tablet Take 1-2 tablets by mouth Every 4 (Four) Hours As Needed (Pain). 10 tablet 0     No current facility-administered medications on file prior to visit.       ALLERGIES:  No Known Allergies    LMP 01/21/2021 (Exact Date)      No flowsheet data found.      Assessment/Plan     42 year old female with recurrent keloid of left medial breast.  I discussed with her my recommendation for adjuvant radiation following excision with the goal of reducing the risk of recurrent keloid.  I discussed possible acute side effects of fatigue, skin erythema, wound infection.  I discussed possible late side effects of recurrent keloid and the remote risk of second malignancies.    She voiced understanding and wishes to proceed with radiation following her surgery.  She will call  us when she has scheduled the date for her surgery.  I explained to her that we will need to see her the same day right after surgery for simulation and first treatment.  I plan to deliver a dose of 1800cGy in 3 fractions.     I personally spent greater than 30  minutes today assessing, managing, discussing and documenting my visit with the patient. That time includes review of records, imaging and pathology reports, obtaining my own history, performing a medically appropriate evaluation, counseling and educating the patient, discussing goals, logistics, alternatives and risks of my recommendations, surveillance options and potential outcomes. It also includes the time documenting the clinical information in the EMR and communicating my recommendations to the other involved physicians.               Thank you very much for allowing me to participate in the care of this very pleasant patient.    Sincerely,      Alexia Gaston MD

## 2022-07-11 ENCOUNTER — OFFICE VISIT (OUTPATIENT)
Dept: RADIATION ONCOLOGY | Facility: HOSPITAL | Age: 42
End: 2022-07-11

## 2022-07-11 ENCOUNTER — APPOINTMENT (OUTPATIENT)
Dept: RADIATION ONCOLOGY | Facility: HOSPITAL | Age: 42
End: 2022-07-11

## 2022-07-11 VITALS
WEIGHT: 220.2 LBS | BODY MASS INDEX: 33.49 KG/M2 | HEART RATE: 72 BPM | DIASTOLIC BLOOD PRESSURE: 94 MMHG | SYSTOLIC BLOOD PRESSURE: 142 MMHG | RESPIRATION RATE: 20 BRPM | OXYGEN SATURATION: 95 %

## 2022-07-11 DIAGNOSIS — L91.0 KELOID: Primary | ICD-10-CM

## 2022-07-11 PROCEDURE — 77290 THER RAD SIMULAJ FIELD CPLX: CPT | Performed by: RADIOLOGY

## 2022-07-11 PROCEDURE — 99214 OFFICE O/P EST MOD 30 MIN: CPT | Performed by: RADIOLOGY

## 2022-07-11 PROCEDURE — 77333 RADIATION TREATMENT AID(S): CPT | Performed by: RADIOLOGY

## 2022-07-11 PROCEDURE — 77263 THER RADIOLOGY TX PLNG CPLX: CPT | Performed by: RADIOLOGY

## 2022-07-11 RX ORDER — FERROUS SULFATE 220 (44)/5
ELIXIR ORAL
COMMUNITY
Start: 2022-01-25

## 2022-07-11 NOTE — PROGRESS NOTES
"  Subjective     No ref. provider found    Cancer Staging  No matching staging information was found for the patient.   No chief complaint on file.   4 week follow up                                CC: keloid left medial breast                                Dear Sundeep Kaplan Jr., MD     I had the pleasure of seeing Jerrica Vargas  today in the Radiation Center    The patient is a 42 y.o. female with a history of a keloid on her left medial breast for which she underwent excision with Dr. Kaplan in 2021.  She did not receive radiation at that time.  She presented to Dr. Kaplan in 2022 with recurrent keloid in the same area of left medial breast 0.9 x 4.3cm according to his note.  He has discussed re-excision of the keloid followed by radiation.  She is referred today for evaluation for radiation.     She reports occasional \"tightness\" associated with the scar/keoid but no real pain.    Interval hx 22:  She underwent excision of the keloid on the medial left breast earlier today.  She is having a little discomfort in the incision area but otherwise doing well.         Review of Systems   Constitutional: Negative.    Skin: Positive for color change and wound.   Psychiatric/Behavioral: Negative.          Past Medical History:   Diagnosis Date   • Anemia    • Disease of thyroid gland    • Hypertension    • Migraine    • Scar of breast     LEFT BREAST    • Sickle cell trait (HCC)          Past Surgical History:   Procedure Laterality Date   •  SECTION     • GASTRIC BYPASS  2020    lapband   • HYSTERECTOMY     • KNEE MENISCECTOMY Right 2020   • SCAR REVISION BREAST Left 10/4/2021    Procedure: EXCISION OF SCAR LEFT MEDIAL BREAST WITH COMPLEX REAPIR;  Surgeon: Sundeep Kaplan Jr., MD;  Location: [facility] OR [facility] [place];  Service: Plastics;  Laterality: Left;         Social History     Socioeconomic History   • Marital status: Single   Tobacco Use   • Smoking status: Never Smoker   Substance and Sexual " Activity   • Alcohol use: Yes     Comment: occ   • Drug use: No   • Sexual activity: Yes     Partners: Male     Birth control/protection: Abstinence         Family History   Problem Relation Age of Onset   • Hypertension Mother    • Breast cancer Mother    • Asthma Sister    • Throat cancer Father    • Prostate cancer Father    • Hypertension Father    • No Known Problems Paternal Grandfather    • No Known Problems Paternal Grandmother    • No Known Problems Maternal Grandmother    • No Known Problems Maternal Grandfather    • Ovarian cancer Neg Hx    • Uterine cancer Neg Hx    • Colon cancer Neg Hx    • Malig Hyperthermia Neg Hx           Objective    Physical Exam  Constitutional:       Appearance: Normal appearance.   Chest:          Comments: Incision medial right breast with packing in place, small amount of bleeding.  Neurological:      Mental Status: She is alert.           Current Outpatient Medications on File Prior to Visit   Medication Sig Dispense Refill   • amLODIPine (NORVASC) 10 MG tablet Take 10 mg by mouth Daily.     • levonorgestrel (MIRENA, 52 MG,) 20 MCG/24HR IUD 1 each by Intrauterine route 1 (One) Time.     • multivitamin (THERAGRAN) tablet tablet Take 1 tablet by mouth Daily.     • oxyCODONE-acetaminophen (PERCOCET) 5-325 MG per tablet Take 1-2 tablets by mouth Every 4 (Four) Hours As Needed (Pain). 10 tablet 0     No current facility-administered medications on file prior to visit.       ALLERGIES:  No Known Allergies    LMP 01/21/2021 (Exact Date)      No flowsheet data found.      Assessment & Plan     42 year old female with recurrent keloid of left medial breast.  I discussed with her again my recommendation for adjuvant radiation following excision with the goal of reducing the risk of recurrent keloid.  I discussed possible acute side effects of fatigue, skin erythema, wound infection.  I discussed possible late side effects of recurrent keloid, hyperpigmentation and the remote risk of  second malignancies.     She voiced understanding and wishes to proceed with radiation.  We will proceed with CT simulation today and begin her treatments tomorrow.  I plan to deliver a dose of 1800cGy in 3 fractions.      I personally spent greater than 30  minutes today assessing, managing, discussing and documenting my visit with the patient. That time includes review of records, imaging and pathology reports, obtaining my own history, performing a medically appropriate evaluation, counseling and educating the patient, discussing goals, logistics, alternatives and risks of my recommendations, surveillance options and potential outcomes. It also includes the time documenting the clinical information in the EMR and communicating my recommendations to the other involved physicians           Thank you very much for allowing me to participate in the care of this very pleasant patient.    Sincerely,      Alexia Gaston MD

## 2022-07-12 LAB
RAD ONC ARIA COURSE ID: NORMAL
RAD ONC ARIA COURSE INTENT: NORMAL
RAD ONC ARIA COURSE LAST TREATMENT DATE: NORMAL
RAD ONC ARIA COURSE START DATE: NORMAL
RAD ONC ARIA COURSE TREATMENT ELAPSED DAYS: 0
RAD ONC ARIA FIRST TREATMENT DATE: NORMAL
RAD ONC ARIA PLAN FRACTIONS TREATED TO DATE: 1
RAD ONC ARIA PLAN ID: NORMAL
RAD ONC ARIA PLAN PRESCRIBED DOSE PER FRACTION: 6 GY
RAD ONC ARIA PLAN PRIMARY REFERENCE POINT: NORMAL
RAD ONC ARIA PLAN TOTAL FRACTIONS PRESCRIBED: 3
RAD ONC ARIA PLAN TOTAL PRESCRIBED DOSE: 1800 CGY
RAD ONC ARIA REFERENCE POINT DOSAGE GIVEN TO DATE: 6 GY
RAD ONC ARIA REFERENCE POINT DOSAGE GIVEN TO DATE: 6.67 GY
RAD ONC ARIA REFERENCE POINT ID: NORMAL
RAD ONC ARIA REFERENCE POINT ID: NORMAL
RAD ONC ARIA REFERENCE POINT SESSION DOSAGE GIVEN: 6 GY
RAD ONC ARIA REFERENCE POINT SESSION DOSAGE GIVEN: 6.67 GY

## 2022-07-12 PROCEDURE — 77321 SPECIAL TELETX PORT PLAN: CPT | Performed by: RADIOLOGY

## 2022-07-12 PROCEDURE — 77334 RADIATION TREATMENT AID(S): CPT | Performed by: RADIOLOGY

## 2022-07-12 PROCEDURE — 77332 RADIATION TREATMENT AID(S): CPT | Performed by: RADIOLOGY

## 2022-07-12 PROCEDURE — 77412 RADIATION TX DELIVERY LVL 3: CPT | Performed by: RADIOLOGY

## 2022-07-12 PROCEDURE — 77280 THER RAD SIMULAJ FIELD SMPL: CPT | Performed by: RADIOLOGY

## 2022-07-12 PROCEDURE — 77427 RADIATION TX MANAGEMENT X5: CPT | Performed by: RADIOLOGY

## 2022-07-13 LAB
RAD ONC ARIA COURSE ID: NORMAL
RAD ONC ARIA COURSE INTENT: NORMAL
RAD ONC ARIA COURSE LAST TREATMENT DATE: NORMAL
RAD ONC ARIA COURSE START DATE: NORMAL
RAD ONC ARIA COURSE TREATMENT ELAPSED DAYS: 1
RAD ONC ARIA FIRST TREATMENT DATE: NORMAL
RAD ONC ARIA PLAN FRACTIONS TREATED TO DATE: 2
RAD ONC ARIA PLAN ID: NORMAL
RAD ONC ARIA PLAN PRESCRIBED DOSE PER FRACTION: 6 GY
RAD ONC ARIA PLAN PRIMARY REFERENCE POINT: NORMAL
RAD ONC ARIA PLAN TOTAL FRACTIONS PRESCRIBED: 3
RAD ONC ARIA PLAN TOTAL PRESCRIBED DOSE: 1800 CGY
RAD ONC ARIA REFERENCE POINT DOSAGE GIVEN TO DATE: 12 GY
RAD ONC ARIA REFERENCE POINT DOSAGE GIVEN TO DATE: 13.33 GY
RAD ONC ARIA REFERENCE POINT ID: NORMAL
RAD ONC ARIA REFERENCE POINT ID: NORMAL
RAD ONC ARIA REFERENCE POINT SESSION DOSAGE GIVEN: 6 GY
RAD ONC ARIA REFERENCE POINT SESSION DOSAGE GIVEN: 6.67 GY

## 2022-07-13 PROCEDURE — 77412 RADIATION TX DELIVERY LVL 3: CPT | Performed by: RADIOLOGY

## 2022-07-14 ENCOUNTER — RADIATION ONCOLOGY WEEKLY ASSESSMENT (OUTPATIENT)
Dept: RADIATION ONCOLOGY | Facility: HOSPITAL | Age: 42
End: 2022-07-14

## 2022-07-14 VITALS
SYSTOLIC BLOOD PRESSURE: 148 MMHG | BODY MASS INDEX: 33.82 KG/M2 | WEIGHT: 222.4 LBS | HEART RATE: 80 BPM | DIASTOLIC BLOOD PRESSURE: 98 MMHG | OXYGEN SATURATION: 96 %

## 2022-07-14 DIAGNOSIS — L91.0 KELOID: Primary | ICD-10-CM

## 2022-07-14 LAB
RAD ONC ARIA COURSE END DATE: NORMAL
RAD ONC ARIA COURSE ID: NORMAL
RAD ONC ARIA COURSE ID: NORMAL
RAD ONC ARIA COURSE INTENT: NORMAL
RAD ONC ARIA COURSE INTENT: NORMAL
RAD ONC ARIA COURSE LAST TREATMENT DATE: NORMAL
RAD ONC ARIA COURSE LAST TREATMENT DATE: NORMAL
RAD ONC ARIA COURSE START DATE: NORMAL
RAD ONC ARIA COURSE START DATE: NORMAL
RAD ONC ARIA COURSE TREATMENT ELAPSED DAYS: 2
RAD ONC ARIA COURSE TREATMENT ELAPSED DAYS: 2
RAD ONC ARIA FIRST TREATMENT DATE: NORMAL
RAD ONC ARIA FIRST TREATMENT DATE: NORMAL
RAD ONC ARIA PLAN FRACTIONS TREATED TO DATE: 3
RAD ONC ARIA PLAN FRACTIONS TREATED TO DATE: 3
RAD ONC ARIA PLAN ID: NORMAL
RAD ONC ARIA PLAN ID: NORMAL
RAD ONC ARIA PLAN NAME: NORMAL
RAD ONC ARIA PLAN PRESCRIBED DOSE PER FRACTION: 6 GY
RAD ONC ARIA PLAN PRESCRIBED DOSE PER FRACTION: 6 GY
RAD ONC ARIA PLAN PRIMARY REFERENCE POINT: NORMAL
RAD ONC ARIA PLAN PRIMARY REFERENCE POINT: NORMAL
RAD ONC ARIA PLAN TOTAL FRACTIONS PRESCRIBED: 3
RAD ONC ARIA PLAN TOTAL FRACTIONS PRESCRIBED: 3
RAD ONC ARIA PLAN TOTAL PRESCRIBED DOSE: 1800 CGY
RAD ONC ARIA PLAN TOTAL PRESCRIBED DOSE: 1800 CGY
RAD ONC ARIA REFERENCE POINT DOSAGE GIVEN TO DATE: 18 GY
RAD ONC ARIA REFERENCE POINT DOSAGE GIVEN TO DATE: 18 GY
RAD ONC ARIA REFERENCE POINT DOSAGE GIVEN TO DATE: 20 GY
RAD ONC ARIA REFERENCE POINT DOSAGE GIVEN TO DATE: 20 GY
RAD ONC ARIA REFERENCE POINT ID: NORMAL
RAD ONC ARIA REFERENCE POINT SESSION DOSAGE GIVEN: 6 GY
RAD ONC ARIA REFERENCE POINT SESSION DOSAGE GIVEN: 6.67 GY

## 2022-07-14 PROCEDURE — 77412 RADIATION TX DELIVERY LVL 3: CPT | Performed by: RADIOLOGY

## 2022-07-14 PROCEDURE — 77336 RADIATION PHYSICS CONSULT: CPT | Performed by: RADIOLOGY

## 2022-07-14 NOTE — PROGRESS NOTES
Physician Weekly Management Note    Diagnosis:     Diagnosis Plan   1. Keloid         RT Details:  fx 3/3 left medial breast skin/keloid resection 18/18Gy    Notes on Treatment course, Films, Medical progress:  Doing well, completes today, follow up 4 weeks no erythema kps 100%    Weekly Management:  Medication reviewed?   Yes  New medications given?   No  Problemlist reviewed?   Yes  Problem added?   No  Issues raised requiring referral to support services - task assigned to:  na    Technical aspects reviewed:  Weekly OBI approved?   Yes  Weekly port films approved?   Yes  Change requests noted on port film?   No  Patient setup and plan reviewed?   Yes    Chart Reviewed:  Continue current treatment plan?   Yes  Treatment plan change requested?   No  CBC reviewed?   Yes  Concurrent Chemo?   No    Objective     Toxicities:   none     Review of Systems   Constitutional: Negative.    Skin: Negative.    Psychiatric/Behavioral: Negative.           There were no vitals filed for this visit.    No flowsheet data found.    Physical Exam  Constitutional:       Appearance: Normal appearance.   Chest:          Comments: heling incision medial left breast, no erythema  Neurological:      Mental Status: She is alert.             Problem Summary List    Diagnosis:     Diagnosis Plan   1. Keloid       Pathology:   keloid    Past Medical History:   Diagnosis Date   • Anemia    • Disease of thyroid gland    • Hypertension    • Migraine    • Scar of breast     LEFT BREAST    • Sickle cell trait (HCC)          Past Surgical History:   Procedure Laterality Date   •  SECTION     • GASTRIC BYPASS  2020    lapband   • HYSTERECTOMY     • KNEE MENISCECTOMY Right 2020   • SCAR REVISION BREAST Left 10/4/2021    Procedure: EXCISION OF SCAR LEFT MEDIAL BREAST WITH COMPLEX REAPIR;  Surgeon: Sundeep Kaplan Jr., MD;  Location: [facility] [place] [facility] [place];  Service: Plastics;  Laterality: Left;         Current Outpatient Medications on File Prior to  Visit   Medication Sig Dispense Refill   • amLODIPine (NORVASC) 10 MG tablet Take 10 mg by mouth Daily.     • ferrous sulfate 220 (44 Fe) MG/5ML solution   1 Tab, Oral, Daily, 0 Refill(s)     • levonorgestrel (MIRENA, 52 MG,) 20 MCG/24HR IUD 1 each by Intrauterine route 1 (One) Time.     • Multiple Vitamins-Minerals (MULTIVITAMIN ADULT EXTRA C PO)   1 Tab, Oral, Daily, 0 Refill(s)     • oxyCODONE-acetaminophen (PERCOCET) 5-325 MG per tablet Take 1-2 tablets by mouth Every 4 (Four) Hours As Needed (Pain). 10 tablet 0     No current facility-administered medications on file prior to visit.       No Known Allergies      Referring Provider:    No referring provider defined for this encounter.    Oncologist:  No primary care provider on file.      Seen and approved by:  Alexia Gaston MD  07/14/2022

## 2022-08-15 ENCOUNTER — OFFICE VISIT (OUTPATIENT)
Dept: RADIATION ONCOLOGY | Facility: HOSPITAL | Age: 42
End: 2022-08-15

## 2022-08-15 ENCOUNTER — APPOINTMENT (OUTPATIENT)
Dept: RADIATION ONCOLOGY | Facility: HOSPITAL | Age: 42
End: 2022-08-15

## 2022-08-15 VITALS
WEIGHT: 225 LBS | BODY MASS INDEX: 34.22 KG/M2 | DIASTOLIC BLOOD PRESSURE: 104 MMHG | HEART RATE: 64 BPM | OXYGEN SATURATION: 100 % | SYSTOLIC BLOOD PRESSURE: 164 MMHG

## 2022-08-15 DIAGNOSIS — L91.0 KELOID: Primary | ICD-10-CM

## 2022-08-15 PROCEDURE — 99212-NC PR NO CHARGE CBC OFFICE OUTPATIENT VISIT 10 MINUTES: Performed by: RADIOLOGY

## 2022-08-15 PROCEDURE — G0463 HOSPITAL OUTPT CLINIC VISIT: HCPCS

## 2022-08-15 RX ORDER — LISINOPRIL 5 MG/1
5 TABLET ORAL DAILY
COMMUNITY
Start: 2022-03-10 | End: 2022-09-06

## 2022-08-15 NOTE — PROGRESS NOTES
Subjective     Alexia Gaston MD    Cancer Staging    Subjective     Alexia Gaston MD    Cancer Staging  No matching staging information was found for the patient.   CC: keloid                              S:    I had the pleasure of seeing Jerrica Vargas  [facility].  She returns today for follow up now 4 weeks out from completion of her radiation therapy to the resected keloid of the left medial breast. She completed a dose of 18Gy in 3 fractions on 22.  She has been doing well since I last saw her.  She states she had some hyperpigmetnation and peeling of her skin and this is resolving.       Review of Systems   Constitutional: Negative.    Skin: Positive for color change and rash.   Psychiatric/Behavioral: Negative.          Past Medical History:   Diagnosis Date   • Anemia    • Disease of thyroid gland    • Hypertension    • Migraine    • Scar of breast     LEFT BREAST    • Sickle cell trait (HCC)          Past Surgical History:   Procedure Laterality Date   •  SECTION     • GASTRIC BYPASS  2020    lapband   • HYSTERECTOMY     • KNEE MENISCECTOMY Right 2020   • SCAR REVISION BREAST Left 10/4/2021    Procedure: EXCISION OF SCAR LEFT MEDIAL BREAST WITH COMPLEX REAPIR;  Surgeon: Sundeep Kaplan Jr., MD;  Location: [facility] OR [facility] [place];  Service: Plastics;  Laterality: Left;         Social History     Socioeconomic History   • Marital status: Single   Tobacco Use   • Smoking status: Never Smoker   Substance and Sexual Activity   • Alcohol use: Yes     Comment: occ   • Drug use: No   • Sexual activity: Yes     Partners: Male     Birth control/protection: Abstinence         Family History   Problem Relation Age of Onset   • Hypertension Mother    • Breast cancer Mother    • Asthma Sister    • Throat cancer Father    • Prostate cancer Father    • Hypertension Father    • No Known Problems Paternal Grandfather    • No Known Problems Paternal Grandmother    • No Known Problems  Maternal Grandmother    • No Known Problems Maternal Grandfather    • Ovarian cancer Neg Hx    • Uterine cancer Neg Hx    • Colon cancer Neg Hx    • Malig Hyperthermia Neg Hx           Objective    Physical Exam  Constitutional:       Appearance: Normal appearance.   Chest:          Comments: Well healed incision with mild hyperpigmentation in treatment area, small areas of dry desquamation at the periphery of tx field in areas where she had tape from bandage  Neurological:      Mental Status: She is alert.           Current Outpatient Medications on File Prior to Visit   Medication Sig Dispense Refill   • lisinopril (PRINIVIL,ZESTRIL) 5 MG tablet Take 5 mg by mouth Daily.     • ferrous sulfate 220 (44 Fe) MG/5ML solution   1 Tab, Oral, Daily, 0 Refill(s)     • Multiple Vitamins-Minerals (MULTIVITAMIN ADULT EXTRA C PO)   1 Tab, Oral, Daily, 0 Refill(s)     • [DISCONTINUED] amLODIPine (NORVASC) 10 MG tablet Take 10 mg by mouth Daily.     • [DISCONTINUED] levonorgestrel (MIRENA, 52 MG,) 20 MCG/24HR IUD 1 each by Intrauterine route 1 (One) Time.     • [DISCONTINUED] oxyCODONE-acetaminophen (PERCOCET) 5-325 MG per tablet Take 1-2 tablets by mouth Every 4 (Four) Hours As Needed (Pain). 10 tablet 0     No current facility-administered medications on file prior to visit.       ALLERGIES:  No Known Allergies    BP (!) 164/104 Comment: pt hasn't taken BP meds  Pulse 64   Wt 102 kg (225 lb)   LMP 01/21/2021 (Exact Date)   SpO2 100%   BMI 34.22 kg/m²      No flowsheet data found.      Assessment & Plan     42 year old female with recurrent keloid of the medial left breast now 4 weeks out from radiation.  She is doing well.  I will see her back in one year for follow up.  She knows to call for this appointment.             Thank you very much for allowing me to participate in the care of this very pleasant patient.    Sincerely,      Alexia Gaston MD

## 2022-08-23 ENCOUNTER — DOCUMENTATION (OUTPATIENT)
Dept: RADIATION ONCOLOGY | Facility: HOSPITAL | Age: 42
End: 2022-08-23

## 2022-08-23 NOTE — PROGRESS NOTES
Subjective     No ref. provider found    Cancer Staging  Diagnosis: recurrent keloid medial left breast    I am writing to let you know   has recently completed the radiation therapy portion of treatment for her recurrent keloid of the medial left breast.  Her treatment course is summarized below:    Site Treated:   Medial left breast incision      Dates Of Treatment:  7/11/22-7/14/22    Total Dose and Treatment Technique:    She received a total dose of 1800cGy in 3 fractions delivered with 6 mev electrons and bolus daily.     Patient Tolerance and Response to Treatment:    she  tolerated her  treatments well.   She had no breaks in the course of treatment.   She developed mild to moderate fatigue near the completion.        Disposition:  Follow up 4 weeks or sooner if necessary.  I have also placed a referral to the Survivorship clinic.

## 2025-02-26 VITALS
SYSTOLIC BLOOD PRESSURE: 150 MMHG | DIASTOLIC BLOOD PRESSURE: 102 MMHG | RESPIRATION RATE: 21 BRPM | HEART RATE: 74 BPM | RESPIRATION RATE: 22 BRPM | RESPIRATION RATE: 12 BRPM | HEIGHT: 68 IN | HEART RATE: 95 BPM | HEART RATE: 77 BPM | OXYGEN SATURATION: 100 % | TEMPERATURE: 97.9 F | HEART RATE: 94 BPM | SYSTOLIC BLOOD PRESSURE: 167 MMHG | HEART RATE: 75 BPM | HEART RATE: 80 BPM | RESPIRATION RATE: 20 BRPM | HEART RATE: 89 BPM | DIASTOLIC BLOOD PRESSURE: 106 MMHG | SYSTOLIC BLOOD PRESSURE: 149 MMHG | HEART RATE: 90 BPM | OXYGEN SATURATION: 98 % | WEIGHT: 193 LBS | SYSTOLIC BLOOD PRESSURE: 136 MMHG | DIASTOLIC BLOOD PRESSURE: 95 MMHG | DIASTOLIC BLOOD PRESSURE: 91 MMHG | RESPIRATION RATE: 15 BRPM | DIASTOLIC BLOOD PRESSURE: 103 MMHG | TEMPERATURE: 97.5 F | SYSTOLIC BLOOD PRESSURE: 166 MMHG | SYSTOLIC BLOOD PRESSURE: 152 MMHG | DIASTOLIC BLOOD PRESSURE: 110 MMHG | DIASTOLIC BLOOD PRESSURE: 117 MMHG | HEART RATE: 82 BPM | SYSTOLIC BLOOD PRESSURE: 155 MMHG | SYSTOLIC BLOOD PRESSURE: 151 MMHG | OXYGEN SATURATION: 97 % | DIASTOLIC BLOOD PRESSURE: 101 MMHG | DIASTOLIC BLOOD PRESSURE: 109 MMHG

## 2025-03-01 PROBLEM — K31.89 INTESTINAL METAPLASIA OF GASTRIC MUCOSA: Status: ACTIVE | Noted: 2025-03-03

## 2025-03-03 ENCOUNTER — AMBULATORY SURGICAL CENTER (AMBULATORY)
Dept: URBAN - METROPOLITAN AREA SURGERY 17 | Facility: SURGERY | Age: 45
End: 2025-03-03
Payer: COMMERCIAL

## 2025-03-03 ENCOUNTER — OFFICE (AMBULATORY)
Dept: URBAN - METROPOLITAN AREA PATHOLOGY 4 | Facility: PATHOLOGY | Age: 45
End: 2025-03-03
Payer: COMMERCIAL

## 2025-03-03 DIAGNOSIS — K31.89 OTHER DISEASES OF STOMACH AND DUODENUM: ICD-10-CM

## 2025-03-03 DIAGNOSIS — K21.9 GASTRO-ESOPHAGEAL REFLUX DISEASE WITHOUT ESOPHAGITIS: ICD-10-CM

## 2025-03-03 DIAGNOSIS — K31.A11 GASTRIC INTESTINAL METAPLASIA WITHOUT DYSPLASIA, INVOLVING T: ICD-10-CM

## 2025-03-03 DIAGNOSIS — Z48.815 ENCOUNTER FOR SURGICAL AFTERCARE FOLLOWING SURGERY ON THE DI: ICD-10-CM

## 2025-03-03 LAB
GI HISTOLOGY: PDF REPORT: (no result)
Lab: (no result)

## 2025-03-03 PROCEDURE — 88305 TISSUE EXAM BY PATHOLOGIST: CPT | Performed by: PATHOLOGY

## 2025-03-03 PROCEDURE — 88342 IMHCHEM/IMCYTCHM 1ST ANTB: CPT | Performed by: PATHOLOGY

## 2025-03-03 PROCEDURE — 43239 EGD BIOPSY SINGLE/MULTIPLE: CPT | Performed by: INTERNAL MEDICINE

## (undated) DEVICE — SYR LL TP 10ML STRL

## (undated) DEVICE — ANTIBACTERIAL UNDYED BRAIDED (POLYGLACTIN 910), SYNTHETIC ABSORBABLE SUTURE: Brand: COATED VICRYL

## (undated) DEVICE — IRRIGATOR BULB ASEPTO 60CC STRL

## (undated) DEVICE — SPNG LAP 18X18IN LF STRL PK/5

## (undated) DEVICE — DRAPE,CHEST,FENES,15X10,STERIL: Brand: MEDLINE

## (undated) DEVICE — APPL CHLORAPREP HI/LITE 26ML ORNG

## (undated) DEVICE — 3M™ STERI-STRIP™ REINFORCED ADHESIVE SKIN CLOSURES, R1548, 1 IN X 5 IN (25 MM X 125 MM), 4 STRIPS/ENVELOPE: Brand: 3M™ STERI-STRIP™

## (undated) DEVICE — SUT SILK 2/0 FS BLK 18IN 685G

## (undated) DEVICE — INTENDED FOR TISSUE SEPARATION, AND OTHER PROCEDURES THAT REQUIRE A SHARP SURGICAL BLADE TO PUNCTURE OR CUT.: Brand: BARD-PARKER ® CARBON RIB-BACK BLADES

## (undated) DEVICE — ADHS LIQ MASTISOL 2/3ML

## (undated) DEVICE — ELECTRD BLD EZ CLN MOD XLNG 2.75IN

## (undated) DEVICE — PK PROC MINOR TOWER 40

## (undated) DEVICE — PATIENT RETURN ELECTRODE, SINGLE-USE, CONTACT QUALITY MONITORING, ADULT, WITH 9FT CORD, FOR PATIENTS WEIGING OVER 33LBS. (15KG): Brand: MEGADYNE

## (undated) DEVICE — DRSNG SURESITE WNDW 4X4.5

## (undated) DEVICE — NEEDLE, QUINCKE, 18GX3.5": Brand: MEDLINE

## (undated) DEVICE — TOWEL,OR,DSP,ST,BLUE,STD,4/PK,20PK/CS: Brand: MEDLINE

## (undated) DEVICE — GLV SURG BIOGEL LTX PF 7 1/2

## (undated) DEVICE — SUT MONOCRYL 4/0 PS2 27IN Y426H ETY426H

## (undated) DEVICE — Device: Brand: FABCO

## (undated) DEVICE — SPNG GZ WOVN 4X4IN 12PLY 10/BX STRL